# Patient Record
Sex: MALE | Race: WHITE | NOT HISPANIC OR LATINO
[De-identification: names, ages, dates, MRNs, and addresses within clinical notes are randomized per-mention and may not be internally consistent; named-entity substitution may affect disease eponyms.]

---

## 2018-01-24 ENCOUNTER — APPOINTMENT (OUTPATIENT)
Dept: OTOLARYNGOLOGY | Facility: CLINIC | Age: 68
End: 2018-01-24
Payer: COMMERCIAL

## 2018-01-24 VITALS
WEIGHT: 165 LBS | SYSTOLIC BLOOD PRESSURE: 137 MMHG | BODY MASS INDEX: 25.9 KG/M2 | HEIGHT: 67 IN | DIASTOLIC BLOOD PRESSURE: 88 MMHG | HEART RATE: 67 BPM

## 2018-01-24 DIAGNOSIS — Z86.39 PERSONAL HISTORY OF OTHER ENDOCRINE, NUTRITIONAL AND METABOLIC DISEASE: ICD-10-CM

## 2018-01-24 PROCEDURE — 99214 OFFICE O/P EST MOD 30 MIN: CPT

## 2018-02-06 ENCOUNTER — APPOINTMENT (OUTPATIENT)
Dept: SPEECH THERAPY | Facility: HOSPITAL | Age: 68
End: 2018-02-06
Payer: COMMERCIAL

## 2018-02-08 ENCOUNTER — FORM ENCOUNTER (OUTPATIENT)
Age: 68
End: 2018-02-08

## 2018-02-09 ENCOUNTER — APPOINTMENT (OUTPATIENT)
Dept: RADIOLOGY | Facility: HOSPITAL | Age: 68
End: 2018-02-09

## 2018-02-09 ENCOUNTER — OUTPATIENT (OUTPATIENT)
Dept: OUTPATIENT SERVICES | Facility: HOSPITAL | Age: 68
LOS: 1 days | Discharge: HOME | End: 2018-02-09

## 2018-02-09 ENCOUNTER — OUTPATIENT (OUTPATIENT)
Dept: OUTPATIENT SERVICES | Facility: HOSPITAL | Age: 68
LOS: 1 days | End: 2018-02-09

## 2018-02-09 ENCOUNTER — APPOINTMENT (OUTPATIENT)
Dept: SPEECH THERAPY | Facility: HOSPITAL | Age: 68
End: 2018-02-09

## 2018-02-09 DIAGNOSIS — K21.9 GASTRO-ESOPHAGEAL REFLUX DISEASE WITHOUT ESOPHAGITIS: ICD-10-CM

## 2018-02-09 PROCEDURE — 74230 X-RAY XM SWLNG FUNCJ C+: CPT | Mod: 26

## 2018-03-05 DIAGNOSIS — R13.12 DYSPHAGIA, OROPHARYNGEAL PHASE: ICD-10-CM

## 2018-03-22 ENCOUNTER — APPOINTMENT (OUTPATIENT)
Dept: OTOLARYNGOLOGY | Facility: CLINIC | Age: 68
End: 2018-03-22
Payer: COMMERCIAL

## 2018-03-22 VITALS
SYSTOLIC BLOOD PRESSURE: 158 MMHG | DIASTOLIC BLOOD PRESSURE: 90 MMHG | HEART RATE: 67 BPM | BODY MASS INDEX: 25.9 KG/M2 | HEIGHT: 67 IN | WEIGHT: 165 LBS

## 2018-03-22 PROCEDURE — 31579 LARYNGOSCOPY TELESCOPIC: CPT

## 2018-03-22 PROCEDURE — 99214 OFFICE O/P EST MOD 30 MIN: CPT | Mod: 25

## 2018-04-11 ENCOUNTER — APPOINTMENT (OUTPATIENT)
Dept: SPEECH THERAPY | Facility: CLINIC | Age: 68
End: 2018-04-11

## 2018-04-13 DIAGNOSIS — R13.13 DYSPHAGIA, PHARYNGEAL PHASE: ICD-10-CM

## 2018-04-18 ENCOUNTER — APPOINTMENT (OUTPATIENT)
Dept: SPEECH THERAPY | Facility: CLINIC | Age: 68
End: 2018-04-18

## 2018-05-02 ENCOUNTER — APPOINTMENT (OUTPATIENT)
Dept: SPEECH THERAPY | Facility: CLINIC | Age: 68
End: 2018-05-02

## 2018-05-03 ENCOUNTER — APPOINTMENT (OUTPATIENT)
Dept: OTOLARYNGOLOGY | Facility: CLINIC | Age: 68
End: 2018-05-03
Payer: COMMERCIAL

## 2018-05-03 VITALS — WEIGHT: 155 LBS | BODY MASS INDEX: 24.28 KG/M2

## 2018-05-03 VITALS
WEIGHT: 165 LBS | DIASTOLIC BLOOD PRESSURE: 63 MMHG | HEART RATE: 69 BPM | BODY MASS INDEX: 25.9 KG/M2 | SYSTOLIC BLOOD PRESSURE: 96 MMHG | HEIGHT: 67 IN

## 2018-05-03 VITALS — HEART RATE: 73 BPM | DIASTOLIC BLOOD PRESSURE: 70 MMHG | SYSTOLIC BLOOD PRESSURE: 105 MMHG

## 2018-05-03 DIAGNOSIS — J31.0 CHRONIC RHINITIS: ICD-10-CM

## 2018-05-03 DIAGNOSIS — B37.0 CANDIDAL STOMATITIS: ICD-10-CM

## 2018-05-03 DIAGNOSIS — J38.4 EDEMA OF LARYNX: ICD-10-CM

## 2018-05-03 PROCEDURE — 99214 OFFICE O/P EST MOD 30 MIN: CPT | Mod: 25

## 2018-05-03 PROCEDURE — 31579 LARYNGOSCOPY TELESCOPIC: CPT

## 2018-05-09 ENCOUNTER — APPOINTMENT (OUTPATIENT)
Dept: SPEECH THERAPY | Facility: CLINIC | Age: 68
End: 2018-05-09

## 2018-05-16 ENCOUNTER — APPOINTMENT (OUTPATIENT)
Dept: SPEECH THERAPY | Facility: CLINIC | Age: 68
End: 2018-05-16

## 2018-06-06 ENCOUNTER — APPOINTMENT (OUTPATIENT)
Dept: SPEECH THERAPY | Facility: CLINIC | Age: 68
End: 2018-06-06

## 2018-06-22 ENCOUNTER — RX RENEWAL (OUTPATIENT)
Age: 68
End: 2018-06-22

## 2018-07-24 ENCOUNTER — RX RENEWAL (OUTPATIENT)
Age: 68
End: 2018-07-24

## 2018-08-15 ENCOUNTER — APPOINTMENT (OUTPATIENT)
Dept: OTOLARYNGOLOGY | Facility: CLINIC | Age: 68
End: 2018-08-15
Payer: COMMERCIAL

## 2018-08-15 VITALS
DIASTOLIC BLOOD PRESSURE: 84 MMHG | HEIGHT: 67 IN | HEART RATE: 65 BPM | WEIGHT: 152.56 LBS | SYSTOLIC BLOOD PRESSURE: 137 MMHG | BODY MASS INDEX: 23.94 KG/M2

## 2018-08-15 PROCEDURE — 31579 LARYNGOSCOPY TELESCOPIC: CPT

## 2018-08-15 PROCEDURE — 99214 OFFICE O/P EST MOD 30 MIN: CPT | Mod: 25

## 2018-08-15 RX ORDER — RANITIDINE 75 MG/1
TABLET ORAL
Refills: 0 | Status: DISCONTINUED | COMMUNITY
End: 2018-08-15

## 2018-08-15 RX ORDER — RANITIDINE HYDROCHLORIDE 300 MG/1
TABLET, FILM COATED ORAL
Refills: 0 | Status: DISCONTINUED | COMMUNITY
End: 2018-08-15

## 2018-08-15 RX ORDER — OXYCODONE AND ACETAMINOPHEN 5; 325 MG/1; MG/1
5-325 TABLET ORAL
Qty: 15 | Refills: 0 | Status: DISCONTINUED | COMMUNITY
Start: 2017-08-06 | End: 2018-08-15

## 2018-08-15 RX ORDER — VALSARTAN AND HYDROCHLOROTHIAZIDE 80; 12.5 MG/1; MG/1
80-12.5 TABLET, FILM COATED ORAL
Refills: 0 | Status: DISCONTINUED | COMMUNITY
End: 2018-08-15

## 2018-08-15 RX ORDER — FLUCONAZOLE 100 MG/1
100 TABLET ORAL
Qty: 11 | Refills: 1 | Status: DISCONTINUED | COMMUNITY
Start: 2018-05-03 | End: 2018-08-15

## 2018-08-15 RX ORDER — ASPIRIN 325 MG/1
TABLET, FILM COATED ORAL
Refills: 0 | Status: DISCONTINUED | COMMUNITY
End: 2018-08-15

## 2019-01-23 ENCOUNTER — APPOINTMENT (OUTPATIENT)
Dept: OTOLARYNGOLOGY | Facility: CLINIC | Age: 69
End: 2019-01-23

## 2019-10-31 ENCOUNTER — APPOINTMENT (OUTPATIENT)
Dept: OTOLARYNGOLOGY | Facility: CLINIC | Age: 69
End: 2019-10-31

## 2019-11-27 ENCOUNTER — APPOINTMENT (OUTPATIENT)
Dept: OTOLARYNGOLOGY | Facility: CLINIC | Age: 69
End: 2019-11-27
Payer: COMMERCIAL

## 2019-11-27 VITALS
SYSTOLIC BLOOD PRESSURE: 122 MMHG | BODY MASS INDEX: 23.86 KG/M2 | DIASTOLIC BLOOD PRESSURE: 79 MMHG | WEIGHT: 152 LBS | HEIGHT: 67 IN | HEART RATE: 65 BPM

## 2019-11-27 PROCEDURE — 69210 REMOVE IMPACTED EAR WAX UNI: CPT

## 2019-11-27 PROCEDURE — 99214 OFFICE O/P EST MOD 30 MIN: CPT | Mod: 25

## 2019-11-27 PROCEDURE — 31579 LARYNGOSCOPY TELESCOPIC: CPT

## 2019-11-27 RX ORDER — NYSTATIN 100000 [USP'U]/ML
100000 SUSPENSION ORAL 3 TIMES DAILY
Qty: 1 | Refills: 0 | Status: DISCONTINUED | COMMUNITY
Start: 2018-03-22 | End: 2019-11-27

## 2019-11-27 RX ORDER — RANITIDINE 300 MG/1
300 TABLET ORAL
Qty: 90 | Refills: 0 | Status: DISCONTINUED | COMMUNITY
Start: 2018-03-22 | End: 2019-11-27

## 2019-11-27 RX ORDER — OMEPRAZOLE 40 MG/1
40 CAPSULE, DELAYED RELEASE ORAL
Qty: 30 | Refills: 0 | Status: DISCONTINUED | COMMUNITY
Start: 2018-03-22 | End: 2019-11-27

## 2019-11-27 NOTE — HISTORY OF PRESENT ILLNESS
[de-identified] : 69 year old male follow up for laryngeal edema and dysphagia, history of fungal infection, dysphonia, no longer using Nystatin and reflux medications prescribed.  Patient reports symptoms have improved since last visit, but continues to have reflux.  Denies voice hoarseness, straining or loss of voice.  Reports no dysphagia, throat pain, odynophagia, bleeding, hemoptysis, dyspnea, mucus production, fevers and throat infections in the past year.  States able to eat and drink without coughing, choking or aspirating. Still having dysphagia and indigestion. Overall better than before. No current meds for last year, symptoms did worsen but now have leveled off. Used nystatin after last visit. BS control good as far as patient knows.  Still complains about vocal fatigue and difficulty being heard.

## 2019-11-27 NOTE — CONSULT LETTER
[Dear  ___] : Dear  [unfilled], [Courtesy Letter:] : I had the pleasure of seeing your patient, [unfilled], in my office today. [Please see my note below.] : Please see my note below. [Consult Closing:] : Thank you very much for allowing me to participate in the care of this patient.  If you have any questions, please do not hesitate to contact me. [Sincerely,] : Sincerely, [FreeTextEntry2] : Slava Savage MD [FreeTextEntry3] : Benjamín Alfred MD, PhD\par Chief, Division of Laryngology\par Department of Otolaryngology\par [facility]\par Pediatric Otolaryngology, [facility]\par  of Otolaryngology\par [facility] [employer] [facility]

## 2019-11-27 NOTE — PHYSICAL EXAM
[Midline] : trachea located in midline position [Laryngoscopy Performed] : laryngoscopy was performed, see procedure section for findings [Normal] : no rashes [de-identified] : moderately asthenic voice, improved raspiness

## 2019-11-27 NOTE — REASON FOR VISIT
[Subsequent Evaluation] : a subsequent evaluation for [Other: _____] : [unfilled] [FreeTextEntry2] : follow up for laryngeal edema and dysphagia, history of fungal infection, dysphonia, no longer using Nystatin and reflux medications prescribed.

## 2020-05-26 ENCOUNTER — RX RENEWAL (OUTPATIENT)
Age: 70
End: 2020-05-26

## 2021-11-15 ENCOUNTER — NON-APPOINTMENT (OUTPATIENT)
Age: 71
End: 2021-11-15

## 2021-11-15 ENCOUNTER — APPOINTMENT (OUTPATIENT)
Dept: OTOLARYNGOLOGY | Facility: CLINIC | Age: 71
End: 2021-11-15
Payer: MEDICARE

## 2021-11-15 VITALS
SYSTOLIC BLOOD PRESSURE: 113 MMHG | WEIGHT: 151 LBS | DIASTOLIC BLOOD PRESSURE: 77 MMHG | HEIGHT: 67 IN | TEMPERATURE: 98.4 F | HEART RATE: 71 BPM | BODY MASS INDEX: 23.7 KG/M2

## 2021-11-15 DIAGNOSIS — T16.1XXA FOREIGN BODY IN RIGHT EAR, INITIAL ENCOUNTER: ICD-10-CM

## 2021-11-15 PROCEDURE — 99213 OFFICE O/P EST LOW 20 MIN: CPT | Mod: 25

## 2021-11-15 PROCEDURE — 69200 CLEAR OUTER EAR CANAL: CPT | Mod: RT

## 2021-11-15 PROCEDURE — 69210 REMOVE IMPACTED EAR WAX UNI: CPT | Mod: LT

## 2021-11-15 NOTE — PROCEDURE
[Risk and Benefits Discussed] : The purpose, risks, discomforts, benefits and alternatives of the procedure have been explained to the patient including no treatment. [Cerumen Impaction] : Cerumen Impaction [Same] : same as the Pre Op Dx. [] : Removal of Cerumen [FreeTextEntry6] : white right eac foreign body removed w/ alligator forceps\par wax removed bilat

## 2021-11-15 NOTE — HISTORY OF PRESENT ILLNESS
[No] : patient does not have a  history of radiation therapy [de-identified] : 69 yo male\par Patient complains he had a pop in his right ear and his ear hasn’t cleared since. Cant hear well and its feels congested. Complains he has a runny nose, he used saline in the past with some relief. No other modifying factors\par \par \par  [Ear Fullness] : ear fullness [Hearing Loss] : hearing loss [Otalgia] : otalgia [Eustachian Tube Dysfunction] : no eustachian tube dysfunction [Cholesteatoma] : no cholesteatoma [Early Onset Hearing Loss] : no early onset hearing loss [Stroke] : no stroke [Clear Rhinorrhea] : clear rhinorrhea [Nasal Congestion] : nasal congestion [Immotile Cilia Syndrome] : no immotile cilia syndrome [Cystic Fibrosis] : no cystic fibrosis [Adenoidectomy] : no adenoidectomy [Allergies] : no allergies [Asthma] : no asthma [Hyperthyroidism] : no hyperthyroidism [Sialadenitis] : no sialadenitis [Hodgkin Disease] : no hodgkin disease [Non-Hodgkin Lymphoma] : no non-hodgkin lymphoma [None] : No risk factors have been identified. [Graves Disease] : no graves disease [Thyroid Cancer] : no thyroid cancer

## 2021-11-15 NOTE — END OF VISIT
[FreeTextEntry3] : I personally saw and examined AJAY MINER in detail. I spoke to JIE Ramon regarding the assessment and plan of care. I reviewed the above assessment and plan of care, and agree. I have made changes in changes in the body of the note where appropriate.I personally reviewed the HPI, PMH, FH, SH, ROS and medications/allergies. I have spoken to JIE Ramon regarding the history and have personally determined the assessment and plan of care, and documented this myself. I was present and participated in all key portions of the encounter and all procedures noted above. I have made changes in the body of the note where appropriate.\par \par Attesting Faculty: See Attending Signature Below \par \par \par  [Time Spent: ___ minutes] : I have spent [unfilled] minutes of time on the encounter.

## 2021-11-15 NOTE — ASSESSMENT
[FreeTextEntry1] : Patient complains he had a pop sensation in his right ear and since then cant hear well \par \par b/l Wax and Right ear FB \par -Cerumen is removed from the right and left  ear canal with a curette and suction.\par -Rx:Debrox be placed in both ears on a routine basis to keep them free of wax.\par -Routine debridement suggested to keep the ears free of wax.\par -Hearing improved with FB removal \par \par DNS and Rhinitis\par -Rx: Steam humidification \par -Hypertonic saline nasal irrigations \par \par f/u prn

## 2021-11-15 NOTE — PHYSICAL EXAM
[Midline] : trachea located in midline position [Normal] : no rashes [de-identified] : right ear wax mixed with FB, left ear wax

## 2022-06-24 ENCOUNTER — APPOINTMENT (OUTPATIENT)
Dept: OTOLARYNGOLOGY | Facility: CLINIC | Age: 72
End: 2022-06-24
Payer: MEDICARE

## 2022-06-24 VITALS
BODY MASS INDEX: 23.54 KG/M2 | SYSTOLIC BLOOD PRESSURE: 116 MMHG | TEMPERATURE: 97.2 F | HEART RATE: 70 BPM | HEIGHT: 67 IN | DIASTOLIC BLOOD PRESSURE: 73 MMHG | WEIGHT: 150 LBS

## 2022-06-24 PROCEDURE — 69210 REMOVE IMPACTED EAR WAX UNI: CPT

## 2022-06-24 PROCEDURE — 99212 OFFICE O/P EST SF 10 MIN: CPT | Mod: 25

## 2022-06-26 NOTE — ASSESSMENT
[FreeTextEntry1] : b/l Wax \par -Cerumen is removed from the right and left  ear canal with a curette and suction.\par -Rx:Debrox be placed in both ears on a routine basis to keep them free of wax.\par -Routine debridement suggested to keep the ears free of wax.\par \par f/u prn

## 2022-06-26 NOTE — HISTORY OF PRESENT ILLNESS
[No] : patient does not have a  history of radiation therapy [de-identified] : 72 yo male\par c/o bilat ear fullness\par feels like wax\par no other modifying factors\par no nasal or throat complaints\par \par \par  [Ear Fullness] : ear fullness [Hearing Loss] : hearing loss [Otalgia] : otalgia [Eustachian Tube Dysfunction] : no eustachian tube dysfunction [Cholesteatoma] : no cholesteatoma [Early Onset Hearing Loss] : no early onset hearing loss [Stroke] : no stroke [Clear Rhinorrhea] : clear rhinorrhea [Nasal Congestion] : nasal congestion [Immotile Cilia Syndrome] : no immotile cilia syndrome [Cystic Fibrosis] : no cystic fibrosis [Adenoidectomy] : no adenoidectomy [Allergies] : no allergies [Asthma] : no asthma [Hyperthyroidism] : no hyperthyroidism [Sialadenitis] : no sialadenitis [Hodgkin Disease] : no hodgkin disease [Non-Hodgkin Lymphoma] : no non-hodgkin lymphoma [None] : No risk factors have been identified. [Graves Disease] : no graves disease [Thyroid Cancer] : no thyroid cancer

## 2022-09-23 ENCOUNTER — NON-APPOINTMENT (OUTPATIENT)
Age: 72
End: 2022-09-23

## 2022-09-23 ENCOUNTER — APPOINTMENT (OUTPATIENT)
Dept: OTOLARYNGOLOGY | Facility: CLINIC | Age: 72
End: 2022-09-23

## 2022-09-23 VITALS
HEIGHT: 67 IN | DIASTOLIC BLOOD PRESSURE: 75 MMHG | BODY MASS INDEX: 23.54 KG/M2 | HEART RATE: 60 BPM | SYSTOLIC BLOOD PRESSURE: 127 MMHG | WEIGHT: 150 LBS | TEMPERATURE: 98 F

## 2022-09-23 DIAGNOSIS — R05.9 COUGH, UNSPECIFIED: ICD-10-CM

## 2022-09-23 DIAGNOSIS — J34.2 DEVIATED NASAL SEPTUM: ICD-10-CM

## 2022-09-23 PROCEDURE — 99214 OFFICE O/P EST MOD 30 MIN: CPT | Mod: 25

## 2022-09-23 PROCEDURE — 31575 DIAGNOSTIC LARYNGOSCOPY: CPT

## 2022-09-23 PROCEDURE — 69210 REMOVE IMPACTED EAR WAX UNI: CPT | Mod: LT

## 2022-09-23 NOTE — PROCEDURE
[FreeTextEntry3] : After informed verbal consent is obtained, cerumen-and foreign body--cotton- is removed from the left ear canal with a curette and suction.\par  [de-identified] : Reason for the procedure-throat symptoms not adequately evaluated by mirror exam\par After informed verbal consent is obtained. \par The fiberoptic laryngoscope #3 is passed via the  nasal cavity. There is no adenoid hypertrophy of the nasopharynx.  \par The hypopharynx is clear with no lesions or masses. \par The vocal cords are clear intact, within normal limits and mobile bilaterally with  \par evidence of posterior laryngeal erythema and edema and erythema of the arytenoids.\par \par Tongue Base-wnl\par Larynx-wnl\par Hypopharynx-wnl\par tongue base, \par vallecular-wnl\par epiglottis-wnl\par subglottis-wnl\par posterior pharyngeal wall-wnl\par \par true vocal cords-wnl\par arytenoids-erythema and edema\par false vocal cords-wnl\par ventricles-wnl \par pyriform sinus-wnl no pooling\par aryepiglottic folds-wnl\par \par

## 2022-09-23 NOTE — PHYSICAL EXAM
[Midline] : trachea located in midline position [Laryngoscopy Performed] : laryngoscopy was performed, see procedure section for findings [de-identified] : left ear wax, right clear  [de-identified] : erythematous  [Normal] : no rashes

## 2022-09-23 NOTE — HISTORY OF PRESENT ILLNESS
[de-identified] : 70 yo male\par Patient here for ear cleaning, has bilateral clogged ears thinks its was. He notes no change in hearing. He complains that he has a cough for several weeks that wont resolve. He thinks his reflux is under control.  [Tinnitus] : no tinnitus [Hearing Loss] : no hearing loss [Eustachian Tube Dysfunction] : no eustachian tube dysfunction [Cholesteatoma] : no cholesteatoma [Early Onset Hearing Loss] : no early onset hearing loss [Stroke] : no stroke [Nasal Congestion] : nasal congestion [Postnasal Drainage] : postnasal drainage [Allergic Rhinitis] : no allergic rhinitis [Asthma] : no asthma [Septal Deviation] : septal deviation [Adenoidectomy] : no adenoidectomy [None] : No associated symptoms are reported.

## 2022-09-23 NOTE — ASSESSMENT
[FreeTextEntry1] : Mr. MINER 71 year M here for routine ear cleaning, complains of cough for several weeks that wont resolve. His thinks his reflux is controlled.  \par \par Wax and FB: \par -Cerumen and FB is removed from the left  ear canal with a curette and suction.\par -Routine debridement suggested to keep the ears free of wax.\par \par Cough secondary to GERD:\par -Antireflux recommendations were given to the patient\par -Maalox and omeprazole prescribed\par -A formal GI evaluation may be needed and was discussed with the patient.\par \par \par f/u prn

## 2023-01-24 ENCOUNTER — APPOINTMENT (OUTPATIENT)
Dept: OTOLARYNGOLOGY | Facility: CLINIC | Age: 73
End: 2023-01-24
Payer: MEDICARE

## 2023-01-24 VITALS
WEIGHT: 158 LBS | DIASTOLIC BLOOD PRESSURE: 72 MMHG | BODY MASS INDEX: 24.8 KG/M2 | HEIGHT: 67 IN | HEART RATE: 68 BPM | TEMPERATURE: 97.3 F | SYSTOLIC BLOOD PRESSURE: 134 MMHG

## 2023-01-24 DIAGNOSIS — H61.23 IMPACTED CERUMEN, BILATERAL: ICD-10-CM

## 2023-01-24 PROCEDURE — 69210 REMOVE IMPACTED EAR WAX UNI: CPT

## 2023-01-24 PROCEDURE — 99213 OFFICE O/P EST LOW 20 MIN: CPT | Mod: 25

## 2023-01-24 NOTE — HISTORY OF PRESENT ILLNESS
[de-identified] : 71 yo male\par PAtient complains of clogged right ear here for routine ear cleaning. He has a cold, has PND with nasal congestion. Not using any nasal sprays.   [Nasal Congestion] : nasal congestion [None] : The patient is currently asymptomatic.

## 2023-01-24 NOTE — ASSESSMENT
[FreeTextEntry1] : Mr. MINER 72 year M here for routine ear cleaning. Has a cold sinus Friday, + nasal congestion and PND \par \par Wax\par -Cerumen is removed from the right and left  ear canal with a curette and suction.\par -Routine debridement suggested to keep the ears free of wax.\par \par DNS and Rhinitis\par -Rx: Steam humidification \par -Advised to use Afrin when flying \par \par \par f/u prn \par

## 2023-02-01 ENCOUNTER — APPOINTMENT (OUTPATIENT)
Dept: OTOLARYNGOLOGY | Facility: CLINIC | Age: 73
End: 2023-02-01
Payer: MEDICARE

## 2023-02-01 VITALS — DIASTOLIC BLOOD PRESSURE: 75 MMHG | HEART RATE: 88 BPM | SYSTOLIC BLOOD PRESSURE: 113 MMHG

## 2023-02-01 DIAGNOSIS — J01.90 ACUTE SINUSITIS, UNSPECIFIED: ICD-10-CM

## 2023-02-01 PROCEDURE — 69210 REMOVE IMPACTED EAR WAX UNI: CPT

## 2023-02-01 PROCEDURE — 99214 OFFICE O/P EST MOD 30 MIN: CPT | Mod: 25

## 2023-02-01 PROCEDURE — 31579 LARYNGOSCOPY TELESCOPIC: CPT

## 2023-02-01 RX ORDER — FLUTICASONE PROPIONATE 50 UG/1
50 SPRAY, METERED NASAL
Qty: 1 | Refills: 2 | Status: ACTIVE | COMMUNITY
Start: 2023-02-01 | End: 1900-01-01

## 2023-02-01 RX ORDER — LEVOFLOXACIN 500 MG/1
500 TABLET, FILM COATED ORAL
Qty: 7 | Refills: 0 | Status: COMPLETED | COMMUNITY
Start: 2021-12-23 | End: 2023-02-01

## 2023-02-01 RX ORDER — PANTOPRAZOLE 40 MG/1
40 TABLET, DELAYED RELEASE ORAL
Qty: 30 | Refills: 0 | Status: COMPLETED | COMMUNITY
Start: 2022-03-13 | End: 2023-02-01

## 2023-02-01 RX ORDER — FLUTICASONE PROPIONATE 50 UG/1
50 SPRAY, METERED NASAL
Qty: 16 | Refills: 0 | Status: COMPLETED | COMMUNITY
Start: 2021-12-23 | End: 2023-02-01

## 2023-02-01 RX ORDER — TIMOLOL MALEATE 5 MG/ML
0.5 SOLUTION OPHTHALMIC
Refills: 0 | Status: ACTIVE | COMMUNITY

## 2023-02-01 RX ORDER — AZELASTINE HYDROCHLORIDE 137 UG/1
0.1 SPRAY, METERED NASAL TWICE DAILY
Qty: 1 | Refills: 5 | Status: ACTIVE | COMMUNITY
Start: 2023-02-01 | End: 1900-01-01

## 2023-02-01 RX ORDER — FAMOTIDINE 20 MG/1
20 TABLET, FILM COATED ORAL
Qty: 180 | Refills: 2 | Status: ACTIVE | COMMUNITY
Start: 2023-02-01 | End: 1900-01-01

## 2023-02-01 RX ORDER — AMLODIPINE BESYLATE 5 MG/1
TABLET ORAL
Refills: 0 | Status: ACTIVE | COMMUNITY

## 2023-02-01 RX ORDER — LOSARTAN POTASSIUM 100 MG/1
TABLET, FILM COATED ORAL
Refills: 0 | Status: ACTIVE | COMMUNITY

## 2023-02-01 RX ORDER — FAMOTIDINE 40 MG/1
40 TABLET, FILM COATED ORAL
Qty: 90 | Refills: 2 | Status: COMPLETED | COMMUNITY
Start: 2019-11-27 | End: 2023-02-01

## 2023-02-01 RX ORDER — ATORVASTATIN CALCIUM 10 MG/1
10 TABLET, FILM COATED ORAL
Refills: 0 | Status: ACTIVE | COMMUNITY

## 2023-02-01 RX ORDER — CHROMIUM 200 MCG
TABLET ORAL
Refills: 0 | Status: COMPLETED | COMMUNITY
End: 2023-02-01

## 2023-02-01 NOTE — REASON FOR VISIT
[Subsequent Evaluation] : a subsequent evaluation for [FreeTextEntry2] : laryngeal edema and dysphagia

## 2023-02-01 NOTE — HISTORY OF PRESENT ILLNESS
[de-identified] : 72 year old male follow up for laryngeal edema and dysphagia, history of fungal infection, and dysphonia \par Last clinical visit 11/27/2019 \par Reports reflux symptoms still persisting. Has stopped taking famotidine. \par Within past week developed cough, wheezing, and congestion. Took azithromycin course as a prophylactic and prednisone, with no relief. Now on nebulization treatments with mild relief. Reports increase in yellow mucus and PND. \par Denies dysphagia, odynophagia, dyspnea, dysphonia, otalgia, recent fevers and/or throat infections. \par Reports clogged ears, normally with hard wax, right worse than left, follows up with Dr. Marc. \par Patient denies otalgia, otorrhea, ear infections, hearing loss, tinnitus, dizziness, vertigo.

## 2023-02-01 NOTE — CONSULT LETTER
[Consult Letter:] : I had the pleasure of evaluating your patient, [unfilled]. [Please see my note below.] : Please see my note below. [Consult Closing:] : Thank you very much for allowing me to participate in the care of this patient.  If you have any questions, please do not hesitate to contact me. [Sincerely,] : Sincerely, [Dear  ___] : Dear  [unfilled], [FreeTextEntry2] : Slava Savage MD [FreeTextEntry3] : Benjamín Alfred MD, PhD\par Chief, Division of Laryngology\par Department of Otolaryngology\par [facility]\par Pediatric Otolaryngology, [facility]\par  of Otolaryngology\par [facility] [employer] [facility]

## 2023-07-26 ENCOUNTER — APPOINTMENT (OUTPATIENT)
Dept: OTOLARYNGOLOGY | Facility: CLINIC | Age: 73
End: 2023-07-26
Payer: MEDICARE

## 2023-07-26 VITALS
DIASTOLIC BLOOD PRESSURE: 69 MMHG | WEIGHT: 156 LBS | BODY MASS INDEX: 24.48 KG/M2 | SYSTOLIC BLOOD PRESSURE: 109 MMHG | HEIGHT: 67 IN | HEART RATE: 65 BPM | TEMPERATURE: 97.9 F

## 2023-07-26 DIAGNOSIS — H90.3 SENSORINEURAL HEARING LOSS, BILATERAL: ICD-10-CM

## 2023-07-26 DIAGNOSIS — J31.0 CHRONIC RHINITIS: ICD-10-CM

## 2023-07-26 PROCEDURE — 99213 OFFICE O/P EST LOW 20 MIN: CPT | Mod: 25

## 2023-07-26 PROCEDURE — 92557 COMPREHENSIVE HEARING TEST: CPT

## 2023-07-26 PROCEDURE — 92567 TYMPANOMETRY: CPT

## 2023-07-26 NOTE — HISTORY OF PRESENT ILLNESS
[de-identified] : 73 yo\par PAtient complains of bilateral ear clogging here for ear cleaning. Has chronic nasal congestion, does not use nasal sprays. \par also h/o GERD and Hoarseness [Hearing Loss] : hearing loss [Nasal Congestion] : nasal congestion [Ear Fullness] : ear fullness [Neck Mass] : no neck mass [Cough] : no cough

## 2023-07-26 NOTE — DATA REVIEWED
[facility]iology
[de-identified] : Hearing Test performed to evaluate the extent of hearing loss and  to explain pt's symptoms\par today's hearing test was personally reviewed and revealed\par Tymps-wnl\par Hearing sensitivity is within normal limits, bilaterally\par 
Hepatology
Heme/Onc
Nephrology

## 2023-07-26 NOTE — END OF VISIT
[FreeTextEntry3] : I personally saw and examined AJAY MINER in detail. I spoke to JIE Ramon regarding the assessment and plan of care. I reviewed the above assessment and plan of care, and agree. I have made changes in changes in the body of the note where appropriate.I personally reviewed the HPI, PMH, FH, SH, ROS and medications/allergies. I have spoken to JIE Ramon regarding the history and have personally determined the assessment and plan of care, and documented this myself. I was present and participated in all key portions of the encounter and all procedures noted above. I have made changes in the body of the note where appropriate.\par \par Attesting Faculty: See Attending Signature Below \par \par \par

## 2023-07-26 NOTE — ASSESSMENT
[FreeTextEntry1] : Mr. MINER 72 year M complains of bilateral ear clogging. + Nasal congestion \par \par Bilateral ETD:\par -Hearing Test performed to evaluate the extent of hearing loss and to explain pt's symptoms-mild HL\par Rec GERD diet \par Steam humidification for nasal obstruction\par \par \par \par \par \par f/u prn

## 2023-08-22 ENCOUNTER — NON-APPOINTMENT (OUTPATIENT)
Age: 73
End: 2023-08-22

## 2023-08-23 ENCOUNTER — APPOINTMENT (OUTPATIENT)
Dept: OTOLARYNGOLOGY | Facility: CLINIC | Age: 73
End: 2023-08-23
Payer: MEDICARE

## 2023-08-23 VITALS
HEIGHT: 67 IN | DIASTOLIC BLOOD PRESSURE: 74 MMHG | SYSTOLIC BLOOD PRESSURE: 112 MMHG | WEIGHT: 160 LBS | BODY MASS INDEX: 25.11 KG/M2 | HEART RATE: 66 BPM

## 2023-08-23 DIAGNOSIS — J38.3 OTHER DISEASES OF VOCAL CORDS: ICD-10-CM

## 2023-08-23 DIAGNOSIS — R09.81 NASAL CONGESTION: ICD-10-CM

## 2023-08-23 DIAGNOSIS — R49.0 DYSPHONIA: ICD-10-CM

## 2023-08-23 PROCEDURE — 99214 OFFICE O/P EST MOD 30 MIN: CPT | Mod: 25

## 2023-08-23 PROCEDURE — 31579 LARYNGOSCOPY TELESCOPIC: CPT

## 2023-08-23 RX ORDER — MUPIROCIN 20 MG/G
2 OINTMENT TOPICAL 3 TIMES DAILY
Qty: 1 | Refills: 1 | Status: ACTIVE | COMMUNITY
Start: 2023-08-23 | End: 1900-01-01

## 2023-08-23 RX ORDER — CEFDINIR 300 MG/1
300 CAPSULE ORAL
Qty: 20 | Refills: 0 | Status: COMPLETED | COMMUNITY
Start: 2023-02-01 | End: 2023-08-23

## 2023-08-23 NOTE — REASON FOR VISIT
[Subsequent Evaluation] : a subsequent evaluation for [FreeTextEntry2] : laryngeal edema and dysphagia.

## 2023-08-23 NOTE — HISTORY OF PRESENT ILLNESS
[de-identified] :  72 year old male follow up for laryngeal edema and dysphagia, history of fungal infection, and dysphonia Reports excessive burping- increased since last visit- taking famotidine 1x daily.   Reports dysphagia with solids.  Reports nasal congestion, clear anterior rhinorrhea- used flonase and astelin for only about 1 month Denies odynophagia, dyspnea, dysphonia, otalgia, recent fevers and/or throat infections. Reports clogged ears, normally with hard wax, right worse than left, follows up with Dr. Marc. Infection cleared from last visit after stronger abx, but still has clear rhinorrhea, both sides Has been taking famotidine 20mg in the mornings Burping is worse with eating but also can happen any time, series when it happens Had MBS 6-9 months ago with GI, had egd, minimal findings per patient (no records)

## 2023-08-23 NOTE — CONSULT LETTER
[Dear  ___] : Dear  [unfilled], [Consult Letter:] : I had the pleasure of evaluating your patient, [unfilled]. [Please see my note below.] : Please see my note below. [Consult Closing:] : Thank you very much for allowing me to participate in the care of this patient.  If you have any questions, please do not hesitate to contact me. [Sincerely,] : Sincerely, [FreeTextEntry2] : Slava Savage MD [FreeTextEntry3] : Benjamín Alfred MD, PhD\par  Chief, Division of Laryngology\par  Department of Otolaryngology\par  [facility]\par  Pediatric Otolaryngology, [facility]\par   of Otolaryngology\par  [facility]  [DrSandra  ___] : Dr. LIMON

## 2023-09-07 ENCOUNTER — NON-APPOINTMENT (OUTPATIENT)
Age: 73
End: 2023-09-07

## 2023-09-12 ENCOUNTER — APPOINTMENT (OUTPATIENT)
Dept: GASTROENTEROLOGY | Facility: CLINIC | Age: 73
End: 2023-09-12
Payer: MEDICARE

## 2023-09-12 DIAGNOSIS — K21.9 GASTRO-ESOPHAGEAL REFLUX DISEASE W/OUT ESOPHAGITIS: ICD-10-CM

## 2023-09-12 DIAGNOSIS — R13.12 DYSPHAGIA, OROPHARYNGEAL PHASE: ICD-10-CM

## 2023-09-12 PROCEDURE — 99442: CPT | Mod: 95

## 2023-09-27 ENCOUNTER — OUTPATIENT (OUTPATIENT)
Dept: OUTPATIENT SERVICES | Facility: HOSPITAL | Age: 73
LOS: 1 days | End: 2023-09-27
Payer: MEDICARE

## 2023-09-27 ENCOUNTER — TRANSCRIPTION ENCOUNTER (OUTPATIENT)
Age: 73
End: 2023-09-27

## 2023-09-27 ENCOUNTER — APPOINTMENT (OUTPATIENT)
Dept: GASTROENTEROLOGY | Facility: HOSPITAL | Age: 73
End: 2023-09-27

## 2023-09-27 VITALS
DIASTOLIC BLOOD PRESSURE: 67 MMHG | TEMPERATURE: 97 F | RESPIRATION RATE: 20 BRPM | OXYGEN SATURATION: 97 % | SYSTOLIC BLOOD PRESSURE: 137 MMHG | WEIGHT: 154.98 LBS | HEIGHT: 67 IN | HEART RATE: 63 BPM

## 2023-09-27 DIAGNOSIS — K21.9 GASTRO-ESOPHAGEAL REFLUX DISEASE WITHOUT ESOPHAGITIS: ICD-10-CM

## 2023-09-27 DIAGNOSIS — Z87.828 PERSONAL HISTORY OF OTHER (HEALED) PHYSICAL INJURY AND TRAUMA: Chronic | ICD-10-CM

## 2023-09-27 DIAGNOSIS — R13.12 DYSPHAGIA, OROPHARYNGEAL PHASE: ICD-10-CM

## 2023-09-27 DIAGNOSIS — Z90.89 ACQUIRED ABSENCE OF OTHER ORGANS: Chronic | ICD-10-CM

## 2023-09-27 PROCEDURE — 91038 ESOPH IMPED FUNCT TEST > 1HR: CPT

## 2023-09-27 PROCEDURE — 91037 ESOPH IMPED FUNCTION TEST: CPT | Mod: 26

## 2023-09-27 PROCEDURE — 91010 ESOPHAGUS MOTILITY STUDY: CPT | Mod: 26

## 2023-09-27 NOTE — ASU PATIENT PROFILE, ADULT - NSICDXPASTMEDICALHX_GEN_ALL_CORE_FT
PAST MEDICAL HISTORY:  GERD (gastroesophageal reflux disease)     Hepatitis C     HLD (hyperlipidemia)     HTN (hypertension)     KIKI (obstructive sleep apnea)

## 2023-09-27 NOTE — PRE PROCEDURE NOTE - PRE PROCEDURE EVALUATION
Attending Physician:              Arsen Madrid MD MSEd    Indication for Procedure:       Dysphagia, h/o Fungal infection, Laryngeal edema, LPRD                PAST MEDICAL & SURGICAL HISTORY:  HTN (hypertension)      HLD (hyperlipidemia)      Hepatitis C      KIKI (obstructive sleep apnea)      GERD (gastroesophageal reflux disease)      History of tonsillectomy      H/O spleen injury            See [employer] Note for further details  ALLERGIES:  No Known Allergies    HOME MEDICATIONS:      See [employer] Note for further details    AICD/PPM: [ ] yes   [x ] no    PERTINENT LAB DATA:                      PHYSICAL EXAMINATION:    Height (cm): 170.2  Weight (kg): 70.3  BMI (kg/m2): 24.3  BSA (m2): 1.81T(C): 36.3  HR: 63  BP: 137/67  RR: 20  SpO2: 97%    Constitutional: NAD  HEENT: PERRLA, EOMI,    Neck:  No JVD  Respiratory: CTAB/L  Cardiovascular: S1 and S2  Gastrointestinal: BS+, soft, NT/ND  Extremities: No peripheral edema  Neurological: A/O x 3, no focal deficits  Psychiatric: Normal mood, normal affect  Skin: No rashes    ASA Class: I [ ]  II [ x]  III [ ]  IV [ ]    COMMENTS:    The patient is a suitable candidate for the planned procedure unless box checked [ ]  No, explain:

## 2023-11-03 PROBLEM — G47.33 OBSTRUCTIVE SLEEP APNEA (ADULT) (PEDIATRIC): Chronic | Status: ACTIVE | Noted: 2023-09-27

## 2023-11-03 PROBLEM — I10 ESSENTIAL (PRIMARY) HYPERTENSION: Chronic | Status: ACTIVE | Noted: 2023-09-27

## 2023-11-03 PROBLEM — B19.20 UNSPECIFIED VIRAL HEPATITIS C WITHOUT HEPATIC COMA: Chronic | Status: ACTIVE | Noted: 2023-09-27

## 2023-11-03 PROBLEM — K21.9 GASTRO-ESOPHAGEAL REFLUX DISEASE WITHOUT ESOPHAGITIS: Chronic | Status: ACTIVE | Noted: 2023-09-27

## 2023-11-03 PROBLEM — E78.5 HYPERLIPIDEMIA, UNSPECIFIED: Chronic | Status: ACTIVE | Noted: 2023-09-27

## 2023-11-09 ENCOUNTER — APPOINTMENT (OUTPATIENT)
Dept: RADIOLOGY | Facility: HOSPITAL | Age: 73
End: 2023-11-09

## 2023-11-09 ENCOUNTER — OUTPATIENT (OUTPATIENT)
Dept: OUTPATIENT SERVICES | Facility: HOSPITAL | Age: 73
LOS: 1 days | End: 2023-11-09
Payer: MEDICARE

## 2023-11-09 DIAGNOSIS — Z87.828 PERSONAL HISTORY OF OTHER (HEALED) PHYSICAL INJURY AND TRAUMA: Chronic | ICD-10-CM

## 2023-11-09 DIAGNOSIS — Z90.89 ACQUIRED ABSENCE OF OTHER ORGANS: Chronic | ICD-10-CM

## 2023-11-09 DIAGNOSIS — R13.12 DYSPHAGIA, OROPHARYNGEAL PHASE: ICD-10-CM

## 2023-11-09 PROCEDURE — 74220 X-RAY XM ESOPHAGUS 1CNTRST: CPT | Mod: 26

## 2023-11-28 ENCOUNTER — APPOINTMENT (OUTPATIENT)
Dept: OTOLARYNGOLOGY | Facility: CLINIC | Age: 73
End: 2023-11-28
Payer: MEDICARE

## 2023-11-28 PROCEDURE — 99214 OFFICE O/P EST MOD 30 MIN: CPT | Mod: 95

## 2023-12-17 ENCOUNTER — TRANSCRIPTION ENCOUNTER (OUTPATIENT)
Age: 73
End: 2023-12-17

## 2023-12-29 ENCOUNTER — OUTPATIENT (OUTPATIENT)
Dept: OUTPATIENT SERVICES | Facility: HOSPITAL | Age: 73
LOS: 1 days | End: 2023-12-29
Payer: MEDICARE

## 2023-12-29 ENCOUNTER — APPOINTMENT (OUTPATIENT)
Dept: CT IMAGING | Facility: IMAGING CENTER | Age: 73
End: 2023-12-29
Payer: MEDICARE

## 2023-12-29 DIAGNOSIS — R13.12 DYSPHAGIA, OROPHARYNGEAL PHASE: ICD-10-CM

## 2023-12-29 DIAGNOSIS — Z87.828 PERSONAL HISTORY OF OTHER (HEALED) PHYSICAL INJURY AND TRAUMA: Chronic | ICD-10-CM

## 2023-12-29 DIAGNOSIS — Z90.89 ACQUIRED ABSENCE OF OTHER ORGANS: Chronic | ICD-10-CM

## 2023-12-29 PROCEDURE — 71260 CT THORAX DX C+: CPT

## 2023-12-29 PROCEDURE — 70491 CT SOFT TISSUE NECK W/DYE: CPT

## 2023-12-29 PROCEDURE — 71260 CT THORAX DX C+: CPT | Mod: 26,MH

## 2023-12-29 PROCEDURE — 70491 CT SOFT TISSUE NECK W/DYE: CPT | Mod: 26,MH

## 2024-04-09 ENCOUNTER — APPOINTMENT (OUTPATIENT)
Dept: OTOLARYNGOLOGY | Facility: CLINIC | Age: 74
End: 2024-04-09
Payer: MEDICARE

## 2024-04-09 PROCEDURE — 99214 OFFICE O/P EST MOD 30 MIN: CPT | Mod: 25

## 2024-04-09 PROCEDURE — 31579 LARYNGOSCOPY TELESCOPIC: CPT

## 2024-04-09 RX ORDER — IPRATROPIUM BROMIDE 42 UG/1
0.06 SPRAY NASAL
Qty: 1 | Refills: 1 | Status: ACTIVE | COMMUNITY
Start: 2023-08-23 | End: 1900-01-01

## 2024-04-09 NOTE — HISTORY OF PRESENT ILLNESS
[de-identified] : c/o dysphagia, dysphonia had manometry study and repeat esophagram, mildly elevated UES pressure and mild reflux respectively taking famotidine at bedtime, taking nasal spray which has been helping no SOB, no change in voice still burping constantly

## 2024-07-03 ENCOUNTER — TRANSCRIPTION ENCOUNTER (OUTPATIENT)
Age: 74
End: 2024-07-03

## 2024-09-09 ENCOUNTER — OUTPATIENT (OUTPATIENT)
Dept: OUTPATIENT SERVICES | Facility: HOSPITAL | Age: 74
LOS: 1 days | End: 2024-09-09

## 2024-09-09 VITALS
WEIGHT: 156.97 LBS | TEMPERATURE: 98 F | HEART RATE: 63 BPM | DIASTOLIC BLOOD PRESSURE: 84 MMHG | SYSTOLIC BLOOD PRESSURE: 140 MMHG | RESPIRATION RATE: 16 BRPM | HEIGHT: 65.5 IN | OXYGEN SATURATION: 99 %

## 2024-09-09 DIAGNOSIS — H40.9 UNSPECIFIED GLAUCOMA: Chronic | ICD-10-CM

## 2024-09-09 DIAGNOSIS — R13.10 DYSPHAGIA, UNSPECIFIED: ICD-10-CM

## 2024-09-09 DIAGNOSIS — Z87.828 PERSONAL HISTORY OF OTHER (HEALED) PHYSICAL INJURY AND TRAUMA: Chronic | ICD-10-CM

## 2024-09-09 DIAGNOSIS — R13.10 DYSPHAGIA, UNSPECIFIED: Chronic | ICD-10-CM

## 2024-09-09 DIAGNOSIS — Z87.81 PERSONAL HISTORY OF (HEALED) TRAUMATIC FRACTURE: Chronic | ICD-10-CM

## 2024-09-09 DIAGNOSIS — S27.809A UNSPECIFIED INJURY OF DIAPHRAGM, INITIAL ENCOUNTER: Chronic | ICD-10-CM

## 2024-09-09 DIAGNOSIS — I10 ESSENTIAL (PRIMARY) HYPERTENSION: ICD-10-CM

## 2024-09-09 DIAGNOSIS — Z86.79 PERSONAL HISTORY OF OTHER DISEASES OF THE CIRCULATORY SYSTEM: Chronic | ICD-10-CM

## 2024-09-09 DIAGNOSIS — Z90.89 ACQUIRED ABSENCE OF OTHER ORGANS: Chronic | ICD-10-CM

## 2024-09-09 DIAGNOSIS — G47.33 OBSTRUCTIVE SLEEP APNEA (ADULT) (PEDIATRIC): ICD-10-CM

## 2024-09-09 RX ORDER — IPRATROPIUM BROMIDE 42 UG/1
0 SPRAY, METERED NASAL
Refills: 0 | DISCHARGE

## 2024-09-09 RX ORDER — TIMOLOL MALEATE 5 MG/ML
2 SOLUTION/ DROPS OPHTHALMIC
Refills: 0 | DISCHARGE

## 2024-09-09 NOTE — H&P PST ADULT - PROBLEM SELECTOR PLAN 4
Pre-op Delirium Screening Questionnaire:    Patient eligible for matthew risk screen age>75?  (if <= 75 then done) NO     Health care proxy paperwork given to patient? Yes (all patients should be given the packet to fill out at home and return on day of surgery to pre-op RN)    Impaired mobility (ie: uses cane, walker, wheelchair, or assist device)? Yes/no    Known dementia diagnosis? Yes/no    Impaired functional status (METS<4)? Yes/no    Malnutrition BMI<20? Yes/no

## 2024-09-09 NOTE — H&P PST ADULT - NSICDXPASTSURGICALHX_GEN_ALL_CORE_FT
PAST SURGICAL HISTORY:  Diaphragm, rupture     Dysphagia, unspecified     Glaucoma     H/O fracture of ankle     H/O spleen injury     History of tonsillectomy     History of varicocele

## 2024-09-09 NOTE — H&P PST ADULT - PROBLEM SELECTOR PLAN 1
Scheduled injection laryngoplasty, esophagoscopy with botox injection, dilation esophagus.  Written & verbal preop instructions, gi prophylaxis - pt to take own & surgical soap given  Pt verbalized good understanding.  Teach back done on surgical soap instructions.  7/9/24 cardiology eval done - pt can undergo ENT procedure at acceptable risk

## 2024-09-09 NOTE — H&P PST ADULT - HISTORY OF PRESENT ILLNESS
72y/o male presents for preop eval for scheduled injection laryngoplasty, esophagoscopy with botox injection, dilation esophagus.  Pt with C/o dysphagia,  dysphonia and frequent burping,  Pt currently on famotidine at bedtime.   As per Surgeon (Dr Alfred)  note - pt had manometry study and repeat esophagram, mildly elevated UES pressure and mild reflux respectively.    ?

## 2024-09-13 NOTE — ASU PATIENT PROFILE, ADULT - FALL HARM RISK - PT AGE POPULATION HIDDEN
Instructions for your upcoming Pain Procedure     Procedure: SCS TRIAL   Performed by: Dr. Garcia    Date: Monday 1/8/24    Arrival Time: 07:00 am     Location:   [place] [address]  Check in is at the Ambulatory Services .   When entering [facility] go through the Main Entrance  walk down the condon and take a left (this is before the glass elevators).   Please see Map Below        YOU ARE HAVING IV SEDATION:  You MUST have a  to bring you, stay with you during your procedure, and drive you home from your appointment.     Diet:  DO NOT EAT anything 8 hrs  prior to your arrival time.   You may have small amounts of clear liquids (water, sprite, 7-up, apple juice) until 05:00 am.    [x]  You are taking BLOOD THINNERS such as:    Aspirin, Excedrin, Coumadin, Xarelto, Ticlid, Pradaxa, Plavix, ETC.   YOU WILL NEED to be off these medications prior to the procedure:     MEDICATION WE ARE HOLDING: Xarelto      LAST DOSE DATE:1/5/24  STOP DATE: 1/6/24  RESTART DATE: 1/15/24 after your leads are removed.     Missed Procedure Appointments:   We understand that unplanned events may cause you to miss your appointments. If you are unable to attend the appointment for your procedure, give us at least 24 hours of notice if possible.  Please be aware that if you miss your procedure appointment without notifying us in advance, we may no longer be able to serve you as your pain management provider.    Diabetics:  Please update your provider who manages your diabetes that you will be having a steriod injection in the next few weeks.   Steroid injections start to affect blood sugar levels soon after the injection and they can remain high for 3-10 days after.     NON-INSULIN dependent:   [] DO NOT TAKE your morning diabetic medications    INSULIN dependent:   [] If your procedure is scheduled during morning hours, DO NOT TAKE your morning Insulin.   [] If your  procedure is scheduled in the afternoon hours, take HALF of your morning dose of Insulin.    If you wear a Continuous Glucose Monitor:  Continous Glucose Monitor (CGM) may not be worn during your procedure. It will need to be removed the X-ray will interfere with its function and may no longer give accurate glucose results.     Illness/Antibiotics:  If you are taking or have taken an antibiotic in the past 2 weeks or have a fever/blister/cold sore, call before your scheduled procedure appointment.    Vaccines:  Please call if you have had or will have a vaccine before your procedure appointment.    Insurance Authorization Need to Know’s:    Prior to your surgical procedure, our team will contact your Insurance Company to initiate a PreAuthorization request.      This is not a guarantee of payment from your insurance company, but rather a step taken to ensure that we have all of the information and documentation for them to confirm the procedure is one that is eligible for coverage under your plan.    We will contact you if we either need more information from you to fulfill the requirements of your insurance company, or if we need to discuss any concerns that may lead to postponement or cancellation of your procedure. If you have any questions regarding your surgery authorization, please check with your insurance company or call our office for an update.    If you need information regarding your level of benefits or out-of-pocket expenses, please contact your insurance company directly.  They can also confirm for you whether or not we (the surgeon and the hospital/surgery center) are in your plan’s preferred network (aka ‘in-network’).    What to do if… My Insurance Changes:  If, at any time, your insurance company, plan or even card changes… Please call our office so that our team can be sure to update your records.  We will need to make sure to submit any PreAuth or bunny to the correct, up-to-date insurance  Adult plan.      What to do if… My Insurance Requires A Referral:  If your insurance company requires a Referral for Specialty Care or to see a Specialist, you will need to confirm with them if you have one on file.    If your insurance carrier does not have a referral, then you will need to contact your Primary Care Physician to have one directly submitted to your insurance company ASAP.    Without a referral on file, your insurance company will not Pre-Authorize your surgery and may not cover any of your care with our specialty.    What to do if… I have a Workers Compensation (W/C) Claim:  If you have a W/C claim, please be sure to provide our reception team with the information you have regarding your claim ASAP.  We will contact your W/C carrier/adjustor to inform them of your upcoming surgery and check the status of your claim (open vs closed).  We will let you know if they advise of any concerns or issues with your claim.  Even if you have an open W/C claim, please also provide us with your personal/family insurance.  We will want to be sure this plan is loaded into your account.  We always PreAuthorize with personal insurance as a back-up to W/C.  Otherwise, if W/C doesn’t cover something along the way, you will receive a bill for the services.    What to do if… I have Month-to-Month Coverage/Premiums:  If you have an insurance plan that is paid for month to month, or is subject to plan change on a monthly basis, please be aware we cannot initiate PreAuthorization until just before the month of your surgery, as your insurance company will need to verify your premium payments/eligibility first.    What to do if… I Do Not Have Insurance Coverage or Have other Insurance/Billing Questions:  Please call our Patient Contact Center:  [phone] to speak with a team member about your billing needs, including possible coverage options, setting up payment plans, our fee schedule, etc.

## 2024-09-15 ENCOUNTER — TRANSCRIPTION ENCOUNTER (OUTPATIENT)
Age: 74
End: 2024-09-15

## 2024-09-16 ENCOUNTER — TRANSCRIPTION ENCOUNTER (OUTPATIENT)
Age: 74
End: 2024-09-16

## 2024-09-16 ENCOUNTER — APPOINTMENT (OUTPATIENT)
Dept: OTOLARYNGOLOGY | Facility: HOSPITAL | Age: 74
End: 2024-09-16

## 2024-09-16 ENCOUNTER — OUTPATIENT (OUTPATIENT)
Dept: OUTPATIENT SERVICES | Facility: HOSPITAL | Age: 74
LOS: 1 days | Discharge: HOME | End: 2024-09-16
Payer: MEDICARE

## 2024-09-16 ENCOUNTER — RESULT REVIEW (OUTPATIENT)
Age: 74
End: 2024-09-16

## 2024-09-16 VITALS
HEART RATE: 63 BPM | WEIGHT: 156.97 LBS | SYSTOLIC BLOOD PRESSURE: 133 MMHG | OXYGEN SATURATION: 98 % | DIASTOLIC BLOOD PRESSURE: 79 MMHG | RESPIRATION RATE: 16 BRPM | HEIGHT: 65 IN | TEMPERATURE: 98 F

## 2024-09-16 DIAGNOSIS — S27.809A UNSPECIFIED INJURY OF DIAPHRAGM, INITIAL ENCOUNTER: Chronic | ICD-10-CM

## 2024-09-16 DIAGNOSIS — R13.10 DYSPHAGIA, UNSPECIFIED: Chronic | ICD-10-CM

## 2024-09-16 DIAGNOSIS — H40.9 UNSPECIFIED GLAUCOMA: Chronic | ICD-10-CM

## 2024-09-16 DIAGNOSIS — Z90.89 ACQUIRED ABSENCE OF OTHER ORGANS: Chronic | ICD-10-CM

## 2024-09-16 DIAGNOSIS — Z87.828 PERSONAL HISTORY OF OTHER (HEALED) PHYSICAL INJURY AND TRAUMA: Chronic | ICD-10-CM

## 2024-09-16 DIAGNOSIS — R13.10 DYSPHAGIA, UNSPECIFIED: ICD-10-CM

## 2024-09-16 DIAGNOSIS — Z86.79 PERSONAL HISTORY OF OTHER DISEASES OF THE CIRCULATORY SYSTEM: Chronic | ICD-10-CM

## 2024-09-16 DIAGNOSIS — Z87.81 PERSONAL HISTORY OF (HEALED) TRAUMATIC FRACTURE: Chronic | ICD-10-CM

## 2024-09-16 PROCEDURE — 71045 X-RAY EXAM CHEST 1 VIEW: CPT | Mod: 26

## 2024-09-16 PROCEDURE — 43193 ESOPHAGOSCP RIG TRNSO BIOPSY: CPT | Mod: 59

## 2024-09-16 PROCEDURE — 43192 ESOPHAGOSCP RIG TRNSO INJECT: CPT | Mod: 59

## 2024-09-16 PROCEDURE — 43450 DILATE ESOPHAGUS 1/MULT PASS: CPT | Mod: 59

## 2024-09-16 PROCEDURE — 88305 TISSUE EXAM BY PATHOLOGIST: CPT | Mod: 26

## 2024-09-16 PROCEDURE — 31571 LARYNGOSCOP W/VC INJ + SCOPE: CPT | Mod: 59

## 2024-09-16 DEVICE — FILLER GEL COSMETIC JUVEDERM VOLUMA XC: Type: IMPLANTABLE DEVICE | Status: FUNCTIONAL

## 2024-09-16 RX ORDER — ACETAMINOPHEN 325 MG/1
650 TABLET ORAL EVERY 6 HOURS
Refills: 0 | Status: DISCONTINUED | OUTPATIENT
Start: 2024-09-16 | End: 2024-09-30

## 2024-09-16 RX ORDER — FENTANYL CITRATE 50 UG/ML
50 INJECTION INTRAMUSCULAR; INTRAVENOUS
Refills: 0 | Status: DISCONTINUED | OUTPATIENT
Start: 2024-09-16 | End: 2024-09-16

## 2024-09-16 RX ORDER — AMLODIPINE BESYLATE 10 MG/1
5 TABLET ORAL DAILY
Refills: 0 | Status: DISCONTINUED | OUTPATIENT
Start: 2024-09-16 | End: 2024-09-30

## 2024-09-16 RX ORDER — TIMOLOL MALEATE 5 MG/ML
1 SOLUTION/ DROPS OPHTHALMIC
Refills: 0 | DISCHARGE

## 2024-09-16 RX ORDER — AMLODIPINE BESYLATE 10 MG/1
1 TABLET ORAL
Refills: 0 | DISCHARGE

## 2024-09-16 RX ORDER — ACETAMINOPHEN 325 MG/1
1000 TABLET ORAL ONCE
Refills: 0 | Status: COMPLETED | OUTPATIENT
Start: 2024-09-16 | End: 2024-09-16

## 2024-09-16 RX ORDER — OXYCODONE HYDROCHLORIDE 5 MG/1
5 TABLET ORAL EVERY 4 HOURS
Refills: 0 | Status: DISCONTINUED | OUTPATIENT
Start: 2024-09-16 | End: 2024-09-16

## 2024-09-16 RX ORDER — TIMOLOL MALEATE 5 MG/ML
1 SOLUTION/ DROPS OPHTHALMIC
Refills: 0 | Status: DISCONTINUED | OUTPATIENT
Start: 2024-09-16 | End: 2024-09-30

## 2024-09-16 RX ORDER — IPRATROPIUM BROMIDE 42 UG/1
2 SPRAY, METERED NASAL
Refills: 0 | DISCHARGE

## 2024-09-16 RX ORDER — ONDANSETRON 2 MG/ML
4 INJECTION, SOLUTION INTRAMUSCULAR; INTRAVENOUS ONCE
Refills: 0 | Status: DISCONTINUED | OUTPATIENT
Start: 2024-09-16 | End: 2024-09-30

## 2024-09-16 RX ORDER — ONDANSETRON 2 MG/ML
4 INJECTION, SOLUTION INTRAMUSCULAR; INTRAVENOUS EVERY 6 HOURS
Refills: 0 | Status: DISCONTINUED | OUTPATIENT
Start: 2024-09-16 | End: 2024-09-30

## 2024-09-16 RX ORDER — FLU VACCINE TS 2012-2013(5YR+) 45MCG/.5ML
0.5 VIAL (ML) INTRAMUSCULAR ONCE
Refills: 0 | Status: DISCONTINUED | OUTPATIENT
Start: 2024-09-16 | End: 2024-09-30

## 2024-09-16 RX ORDER — LOSARTAN POTASSIUM 50 MG/1
100 TABLET ORAL DAILY
Refills: 0 | Status: DISCONTINUED | OUTPATIENT
Start: 2024-09-16 | End: 2024-09-30

## 2024-09-16 RX ORDER — OXYCODONE HYDROCHLORIDE 5 MG/1
5 TABLET ORAL ONCE
Refills: 0 | Status: DISCONTINUED | OUTPATIENT
Start: 2024-09-16 | End: 2024-09-16

## 2024-09-16 RX ORDER — FAMOTIDINE 10 MG/ML
40 INJECTION INTRAVENOUS DAILY
Refills: 0 | Status: DISCONTINUED | OUTPATIENT
Start: 2024-09-16 | End: 2024-09-30

## 2024-09-16 RX ORDER — FAMOTIDINE 10 MG/ML
1 INJECTION INTRAVENOUS
Refills: 0 | DISCHARGE

## 2024-09-16 RX ORDER — LOSARTAN POTASSIUM 50 MG/1
1 TABLET ORAL
Refills: 0 | DISCHARGE

## 2024-09-16 RX ORDER — OXYCODONE HYDROCHLORIDE 5 MG/1
2.5 TABLET ORAL EVERY 4 HOURS
Refills: 0 | Status: DISCONTINUED | OUTPATIENT
Start: 2024-09-16 | End: 2024-09-16

## 2024-09-16 RX ADMIN — Medication 10 MILLIGRAM(S): at 22:35

## 2024-09-16 RX ADMIN — FAMOTIDINE 40 MILLIGRAM(S): 10 INJECTION INTRAVENOUS at 16:39

## 2024-09-16 RX ADMIN — ACETAMINOPHEN 400 MILLIGRAM(S): 325 TABLET ORAL at 10:10

## 2024-09-16 RX ADMIN — ACETAMINOPHEN 1000 MILLIGRAM(S): 325 TABLET ORAL at 10:45

## 2024-09-16 RX ADMIN — ACETAMINOPHEN 400 MILLIGRAM(S): 325 TABLET ORAL at 16:31

## 2024-09-16 RX ADMIN — TIMOLOL MALEATE 1 DROP(S): 5 SOLUTION/ DROPS OPHTHALMIC at 22:35

## 2024-09-16 RX ADMIN — ACETAMINOPHEN 650 MILLIGRAM(S): 325 TABLET ORAL at 22:35

## 2024-09-16 RX ADMIN — ACETAMINOPHEN 1000 MILLIGRAM(S): 325 TABLET ORAL at 17:00

## 2024-09-16 RX ADMIN — ACETAMINOPHEN 650 MILLIGRAM(S): 325 TABLET ORAL at 23:27

## 2024-09-16 NOTE — ASU DISCHARGE PLAN (ADULT/PEDIATRIC) - CARE PROVIDER_API CALL
Benjamín Alfred  Otolaryngology  [address] [place] [id number]  Phone: [phone]  Fax: [phone]  Established Patient  Follow Up Time:

## 2024-09-16 NOTE — ASU DISCHARGE PLAN (ADULT/PEDIATRIC) - NURSING INSTRUCTIONS
You can take Tylenol every 6  hrs for pain relief.  DO NOT TAKE MORE THAN 3000 MG OF TYLENOL in a 24 hour period.

## 2024-09-16 NOTE — BRIEF OPERATIVE NOTE - NSICDXBRIEFPROCEDURE_GEN_ALL_CORE_FT
PROCEDURES:  Dilation, esophagus, by bougie 16-Sep-2024 09:26:32  Shelli Ribeiro  Rigid esophagoscopy 16-Sep-2024 09:26:42  Shelli Ribeiro  Microscopic direct laryngoscopy 16-Sep-2024 09:26:49  Shelli Ribeiro  Injection, botulinum toxin, cricopharyngeal 16-Sep-2024 09:26:58  Shelli Ribeiro  Laryngoscopy, direct, with vocal cord injection 16-Sep-2024 09:27:03  Shelli Ribeiro

## 2024-09-16 NOTE — BRIEF OPERATIVE NOTE - NSICDXBRIEFPREOP_GEN_ALL_CORE_FT
PRE-OP DIAGNOSIS:  Dysphagia 16-Sep-2024 09:27:16  Shelli Ribeiro  Dysphonia 16-Sep-2024 09:27:40  Shelli Ribeiro

## 2024-09-16 NOTE — BRIEF OPERATIVE NOTE - NSICDXBRIEFPOSTOP_GEN_ALL_CORE_FT
POST-OP DIAGNOSIS:  Dysphonia 16-Sep-2024 09:27:54  Shelli Ribeiro  Dysphagia 16-Sep-2024 09:27:47  Shelli Ribeiro

## 2024-09-16 NOTE — PACU DISCHARGE NOTE - COMMENTS
No apparent anesthesia complications     PACU overnight for KIKI on home applicator (not on CPAP) + s/p laryngoplasty

## 2024-09-16 NOTE — BRIEF OPERATIVE NOTE - OPERATION/FINDINGS
Small esophageal lesion, biopsied. Botox to cricopharyngeal bar. Bilateral juvederm injected into TVCs.

## 2024-09-17 VITALS — RESPIRATION RATE: 15 BRPM | OXYGEN SATURATION: 100 % | HEART RATE: 74 BPM

## 2024-09-17 RX ORDER — OXYCODONE HYDROCHLORIDE 5 MG/1
1 TABLET ORAL
Qty: 16 | Refills: 0
Start: 2024-09-17 | End: 2024-09-20

## 2024-09-17 RX ADMIN — TIMOLOL MALEATE 1 DROP(S): 5 SOLUTION/ DROPS OPHTHALMIC at 06:23

## 2024-09-17 RX ADMIN — LOSARTAN POTASSIUM 100 MILLIGRAM(S): 50 TABLET ORAL at 06:23

## 2024-09-17 RX ADMIN — AMLODIPINE BESYLATE 5 MILLIGRAM(S): 10 TABLET ORAL at 06:24

## 2024-09-17 NOTE — PROGRESS NOTE ADULT - SUBJECTIVE AND OBJECTIVE BOX
OTOLARYNGOLOGY (ENT) PROGRESS NOTE    PATIENT: AJAY MINER  MRN: [id number]  : 52  [id number]  DATE OF SERVICE:  24  	  Subjective/ Interval:   AFVSS. No acute events overnight. Patient seen and examined at bedside. Feels well this morning, endorsing hoarseness.    PE:  General: NAD  Respiratory: No respiratory distress, stridor, or stertor on room air  Face:  Symmetric without masses or lesions  OU: EOMI  Right: Pinna wnl  Left: Pinna wnl  Nose: nares patent  OC/OP: tongue normal  Neck: soft/flat, no LAD       LABS             Coagulation Studies-       Endocrine Panel-                MICROBIOLOGY:

## 2024-09-19 LAB — SURGICAL PATHOLOGY STUDY: SIGNIFICANT CHANGE UP

## 2024-09-25 ENCOUNTER — APPOINTMENT (OUTPATIENT)
Dept: OTOLARYNGOLOGY | Facility: CLINIC | Age: 74
End: 2024-09-25
Payer: MEDICARE

## 2024-09-25 PROCEDURE — 92520 LARYNGEAL FUNCTION STUDIES: CPT | Mod: GN

## 2024-09-25 PROCEDURE — 92524 BEHAVRAL QUALIT ANALYS VOICE: CPT | Mod: GN

## 2024-09-27 ENCOUNTER — APPOINTMENT (OUTPATIENT)
Dept: OTOLARYNGOLOGY | Facility: CLINIC | Age: 74
End: 2024-09-27
Payer: MEDICARE

## 2024-09-27 PROCEDURE — 92507 TX SP LANG VOICE COMM INDIV: CPT | Mod: GN

## 2024-09-30 ENCOUNTER — APPOINTMENT (OUTPATIENT)
Dept: OTOLARYNGOLOGY | Facility: CLINIC | Age: 74
End: 2024-09-30
Payer: MEDICARE

## 2024-09-30 PROCEDURE — 92507 TX SP LANG VOICE COMM INDIV: CPT | Mod: GN

## 2024-10-01 ENCOUNTER — APPOINTMENT (OUTPATIENT)
Dept: OTOLARYNGOLOGY | Facility: CLINIC | Age: 74
End: 2024-10-01

## 2024-10-08 ENCOUNTER — APPOINTMENT (OUTPATIENT)
Dept: OTOLARYNGOLOGY | Facility: CLINIC | Age: 74
End: 2024-10-08
Payer: MEDICARE

## 2024-10-08 VITALS
WEIGHT: 153 LBS | DIASTOLIC BLOOD PRESSURE: 76 MMHG | HEIGHT: 67 IN | SYSTOLIC BLOOD PRESSURE: 117 MMHG | BODY MASS INDEX: 24.01 KG/M2

## 2024-10-08 PROCEDURE — 99214 OFFICE O/P EST MOD 30 MIN: CPT | Mod: 25

## 2024-10-08 PROCEDURE — 31579 LARYNGOSCOPY TELESCOPIC: CPT

## 2024-10-30 ENCOUNTER — APPOINTMENT (OUTPATIENT)
Dept: OTOLARYNGOLOGY | Facility: CLINIC | Age: 74
End: 2024-10-30
Payer: MEDICARE

## 2024-10-30 PROCEDURE — 92507 TX SP LANG VOICE COMM INDIV: CPT | Mod: GN

## 2024-11-20 ENCOUNTER — APPOINTMENT (OUTPATIENT)
Dept: OTOLARYNGOLOGY | Facility: CLINIC | Age: 74
End: 2024-11-20
Payer: MEDICARE

## 2024-11-20 PROCEDURE — 92507 TX SP LANG VOICE COMM INDIV: CPT | Mod: GN

## 2024-11-25 ENCOUNTER — APPOINTMENT (OUTPATIENT)
Dept: OTOLARYNGOLOGY | Facility: CLINIC | Age: 74
End: 2024-11-25
Payer: MEDICARE

## 2024-11-25 PROCEDURE — 92507 TX SP LANG VOICE COMM INDIV: CPT | Mod: GN

## 2024-12-04 ENCOUNTER — APPOINTMENT (OUTPATIENT)
Dept: OTOLARYNGOLOGY | Facility: CLINIC | Age: 74
End: 2024-12-04
Payer: MEDICARE

## 2024-12-04 PROCEDURE — 92507 TX SP LANG VOICE COMM INDIV: CPT | Mod: GN

## 2024-12-10 ENCOUNTER — APPOINTMENT (OUTPATIENT)
Dept: OTOLARYNGOLOGY | Facility: CLINIC | Age: 74
End: 2024-12-10

## 2024-12-10 PROCEDURE — 92507 TX SP LANG VOICE COMM INDIV: CPT | Mod: GN

## 2024-12-18 ENCOUNTER — APPOINTMENT (OUTPATIENT)
Dept: OTOLARYNGOLOGY | Facility: CLINIC | Age: 74
End: 2024-12-18
Payer: MEDICARE

## 2024-12-18 PROCEDURE — 92507 TX SP LANG VOICE COMM INDIV: CPT | Mod: GN

## 2025-01-08 ENCOUNTER — APPOINTMENT (OUTPATIENT)
Dept: OTOLARYNGOLOGY | Facility: CLINIC | Age: 75
End: 2025-01-08
Payer: MEDICARE

## 2025-01-08 PROCEDURE — 92507 TX SP LANG VOICE COMM INDIV: CPT | Mod: GN

## 2025-01-14 ENCOUNTER — APPOINTMENT (OUTPATIENT)
Dept: OTOLARYNGOLOGY | Facility: CLINIC | Age: 75
End: 2025-01-14

## 2025-03-19 ENCOUNTER — APPOINTMENT (OUTPATIENT)
Dept: OTOLARYNGOLOGY | Facility: CLINIC | Age: 75
End: 2025-03-19

## 2025-03-19 VITALS
HEIGHT: 66 IN | WEIGHT: 153 LBS | DIASTOLIC BLOOD PRESSURE: 75 MMHG | SYSTOLIC BLOOD PRESSURE: 113 MMHG | HEART RATE: 64 BPM | OXYGEN SATURATION: 98 % | BODY MASS INDEX: 24.59 KG/M2

## 2025-03-19 DIAGNOSIS — R49.0 DYSPHONIA: ICD-10-CM

## 2025-03-19 PROCEDURE — 99214 OFFICE O/P EST MOD 30 MIN: CPT | Mod: 25

## 2025-03-19 PROCEDURE — 69210 REMOVE IMPACTED EAR WAX UNI: CPT

## 2025-03-19 PROCEDURE — 31579 LARYNGOSCOPY TELESCOPIC: CPT

## 2025-05-06 ENCOUNTER — APPOINTMENT (OUTPATIENT)
Dept: OTOLARYNGOLOGY | Facility: CLINIC | Age: 75
End: 2025-05-06
Payer: MEDICARE

## 2025-05-06 VITALS
SYSTOLIC BLOOD PRESSURE: 107 MMHG | HEIGHT: 66 IN | HEART RATE: 68 BPM | BODY MASS INDEX: 24.59 KG/M2 | WEIGHT: 153 LBS | DIASTOLIC BLOOD PRESSURE: 70 MMHG

## 2025-05-06 DIAGNOSIS — G47.33 OBSTRUCTIVE SLEEP APNEA (ADULT) (PEDIATRIC): ICD-10-CM

## 2025-05-06 DIAGNOSIS — R49.0 DYSPHONIA: ICD-10-CM

## 2025-05-06 DIAGNOSIS — H90.3 SENSORINEURAL HEARING LOSS, BILATERAL: ICD-10-CM

## 2025-05-06 DIAGNOSIS — K21.9 GASTRO-ESOPHAGEAL REFLUX DISEASE W/OUT ESOPHAGITIS: ICD-10-CM

## 2025-05-06 DIAGNOSIS — H61.23 IMPACTED CERUMEN, BILATERAL: ICD-10-CM

## 2025-05-06 DIAGNOSIS — J38.3 OTHER DISEASES OF VOCAL CORDS: ICD-10-CM

## 2025-05-06 DIAGNOSIS — H92.09 OTALGIA, UNSPECIFIED EAR: ICD-10-CM

## 2025-05-06 DIAGNOSIS — M26.609 UNSPECIFIED TEMPOROMANDIBULAR JOINT DISORDER: ICD-10-CM

## 2025-05-06 PROCEDURE — G2211 COMPLEX E/M VISIT ADD ON: CPT

## 2025-05-06 PROCEDURE — 99213 OFFICE O/P EST LOW 20 MIN: CPT

## (undated) DEVICE — TUBING SUCTION CONN 6FT STERILE

## (undated) DEVICE — POSITIONER PATIENT SAFETY STRAP 3X60"

## (undated) DEVICE — DRSG TELFA 3 X 8

## (undated) DEVICE — SYR LUER LOK 50CC

## (undated) DEVICE — SYR LUER LOK 3CC

## (undated) DEVICE — GLV 7.5 PROTEXIS (CREAM) MICRO

## (undated) DEVICE — SOL ANTI FOG

## (undated) DEVICE — SYR LUER LOK 5CC

## (undated) DEVICE — KNIFE ELECTROSURGICAL 4X0.7MM 165CM

## (undated) DEVICE — WARMING BLANKET UPPER ADULT

## (undated) DEVICE — Device

## (undated) DEVICE — FOLEY HOLDER STATLOCK 2 WAY ADULT

## (undated) DEVICE — MADGIC LARYNGO-TRACHEAL MUCOSAL ATOMIZATION DEVICE WITH 3 ML SYRINGE

## (undated) DEVICE — SYR LUER LOK 20CC

## (undated) DEVICE — LABELS BLANK W PEN

## (undated) DEVICE — TUBING SUCTION NONCONDUCTIVE 6MM X 12FT

## (undated) DEVICE — LIJ-SATALOFF ANT COMMISSURE LARYNGOSCOPE: Type: DURABLE MEDICAL EQUIPMENT

## (undated) DEVICE — SUCTION YANKAUER NO CONTROL VENT